# Patient Record
Sex: MALE | Race: WHITE
[De-identification: names, ages, dates, MRNs, and addresses within clinical notes are randomized per-mention and may not be internally consistent; named-entity substitution may affect disease eponyms.]

---

## 2020-06-29 ENCOUNTER — HOSPITAL ENCOUNTER (OUTPATIENT)
Dept: HOSPITAL 53 - M RAD | Age: 60
End: 2020-06-29
Attending: NURSE PRACTITIONER
Payer: MEDICARE

## 2020-06-29 DIAGNOSIS — N25.81: Primary | ICD-10-CM

## 2020-06-29 DIAGNOSIS — E83.52: ICD-10-CM

## 2020-06-29 PROCEDURE — 78803 RP LOCLZJ TUM SPECT 1 AREA: CPT

## 2020-06-29 NOTE — REP
PARATHYROID NUCLEAR SCINTIGRAPHY WITH SPECT:

 

HISTORY:  Hyperparathyroidism.

 

Comparison study is from November 10, 2016.

 

TECHNIQUE:  27.4 mCi of technetium 99m sestamibi is injected.  15-minute and

3-hour delayed planar images are acquired of the neck and chest region.  A SPECT

acquisition is acquired as well.

 

SCINTIGRAPHIC FINDINGS:  15-minute delayed images demonstrate expected salivary

and thyroid uptake.  Thyroid glandular uptake washes out on delayed scan images.

No focal area of retained uptake is seen in the neck or mediastinum to suggest

adenoma. Axial, coronal, and sagittal SPECT imaging shows no additional finding.

 

IMPRESSION:

 

Negative parathyroid nuclear scintigraphy.

 

 

Electronically Signed by

Freddie Ivey MD 06/29/2020 02:48 P

## 2020-09-01 ENCOUNTER — HOSPITAL ENCOUNTER (OUTPATIENT)
Dept: HOSPITAL 53 - M LAB REF | Age: 60
End: 2020-09-01
Attending: NURSE PRACTITIONER
Payer: MEDICARE

## 2020-09-01 DIAGNOSIS — Z94.0: ICD-10-CM

## 2020-09-01 DIAGNOSIS — R53.83: Primary | ICD-10-CM

## 2020-09-01 LAB
T4 FREE SERPL-MCNC: 1.05 NG/DL (ref 0.76–1.46)
TSH SERPL DL<=0.005 MIU/L-ACNC: 1.72 UIU/ML (ref 0.36–3.74)

## 2020-09-15 ENCOUNTER — HOSPITAL ENCOUNTER (OUTPATIENT)
Dept: HOSPITAL 53 - M RAD | Age: 60
End: 2020-09-15
Attending: NURSE PRACTITIONER
Payer: MEDICARE

## 2020-09-15 DIAGNOSIS — N17.9: ICD-10-CM

## 2020-09-15 DIAGNOSIS — Z94.0: Primary | ICD-10-CM

## 2020-09-30 NOTE — REP
TRANSPLANT KIDNEY ULTRASOUND 



HISTORY: Right lower quadrant renal transplant. Acute kidney failure. 



COMPARISON: 11/10/2016. 



SONOGRAPHIC FINDINGS: 

Scanning of the right iliac fossa demonstrates morphologically intact right 
renal transplant measuring 11.3 x 6.3 x 6.2 cm in overall dimension. There is no
evidence of hydronephrosis, cyst, or mass. Previous length measurement was 10.8 
cm. 



DOPPLER ASSESSMENT: 

Pre-anastomotic iliac artery Doppler peak systolic flow velocity is 150 cm/s. 
Main renal artery at the level of the anastomosis has a PSV of 151 cm/s. Mid 
transplant right renal artery peak systolic flow velocity is 131 cm/s and at the
hilum 25 cm/s. The transplant renal vein is widely patent. Resistive indices are
measured in the upper mid and lower third of the transplant kidney and these 
values are normal at 0.62, 0.63, and 0.54 respectively.   



IMPRESSION: 

No evidence of main renal artery stenosis. Somewhat decreased flow in the hilar 
vessels, 25 cm/s flow velocity. No hydronephrosis. Normal renal cortical 
echogenicity.   

MTDD

## 2020-10-01 ENCOUNTER — HOSPITAL ENCOUNTER (OUTPATIENT)
Dept: HOSPITAL 53 - M LAB REF | Age: 60
End: 2020-10-01
Attending: NURSE PRACTITIONER
Payer: MEDICARE

## 2020-10-01 DIAGNOSIS — Z94.0: Primary | ICD-10-CM

## 2020-11-04 ENCOUNTER — HOSPITAL ENCOUNTER (OUTPATIENT)
Dept: HOSPITAL 53 - M LAB REF | Age: 60
End: 2020-11-04
Attending: NURSE PRACTITIONER
Payer: MEDICARE

## 2020-11-04 DIAGNOSIS — Z94.0: Primary | ICD-10-CM

## 2021-01-05 ENCOUNTER — HOSPITAL ENCOUNTER (OUTPATIENT)
Dept: HOSPITAL 53 - M LAB REF | Age: 61
End: 2021-01-05
Attending: INTERNAL MEDICINE
Payer: MEDICARE

## 2021-01-05 DIAGNOSIS — Z94.0: Primary | ICD-10-CM

## 2021-03-05 ENCOUNTER — HOSPITAL ENCOUNTER (OUTPATIENT)
Dept: HOSPITAL 53 - M LAB REF | Age: 61
End: 2021-03-05
Attending: NURSE PRACTITIONER
Payer: MEDICARE

## 2021-03-05 DIAGNOSIS — Z94.0: Primary | ICD-10-CM

## 2021-06-08 ENCOUNTER — HOSPITAL ENCOUNTER (OUTPATIENT)
Dept: HOSPITAL 53 - M LAB REF | Age: 61
End: 2021-06-08
Attending: NURSE PRACTITIONER
Payer: MEDICARE

## 2021-06-08 DIAGNOSIS — Z94.0: ICD-10-CM

## 2021-06-08 DIAGNOSIS — R53.83: Primary | ICD-10-CM

## 2021-06-08 LAB
T3FREE SERPL-MCNC: 2.6 PG/ML (ref 2.2–4)
T4 FREE SERPL-MCNC: 0.9 NG/DL (ref 0.76–1.46)
TSH SERPL DL<=0.005 MIU/L-ACNC: 1.22 UIU/ML (ref 0.36–3.74)

## 2021-09-10 ENCOUNTER — HOSPITAL ENCOUNTER (OUTPATIENT)
Dept: HOSPITAL 53 - M LAB REF | Age: 61
End: 2021-09-10
Attending: NURSE PRACTITIONER
Payer: MEDICARE

## 2021-09-10 DIAGNOSIS — Z94.0: Primary | ICD-10-CM

## 2021-09-10 DIAGNOSIS — R53.83: ICD-10-CM

## 2021-09-10 DIAGNOSIS — E83.42: ICD-10-CM

## 2021-09-10 LAB
MAGNESIUM SERPL-MCNC: 1.6 MG/DL (ref 1.8–2.4)
T3FREE SERPL-MCNC: 2.5 PG/ML (ref 2.2–4)
T4 FREE SERPL-MCNC: 0.89 NG/DL (ref 0.76–1.46)
TSH SERPL DL<=0.005 MIU/L-ACNC: 1.17 UIU/ML (ref 0.36–3.74)

## 2021-11-16 ENCOUNTER — HOSPITAL ENCOUNTER (OUTPATIENT)
Dept: HOSPITAL 53 - M LAB REF | Age: 61
End: 2021-11-16
Attending: NURSE PRACTITIONER
Payer: MEDICARE

## 2021-11-16 DIAGNOSIS — Z94.0: ICD-10-CM

## 2021-11-16 DIAGNOSIS — E83.42: Primary | ICD-10-CM

## 2022-02-17 ENCOUNTER — HOSPITAL ENCOUNTER (OUTPATIENT)
Dept: HOSPITAL 53 - M LAB REF | Age: 62
End: 2022-02-17
Attending: NURSE PRACTITIONER
Payer: MEDICARE

## 2022-02-17 DIAGNOSIS — E83.42: Primary | ICD-10-CM

## 2022-06-15 ENCOUNTER — HOSPITAL ENCOUNTER (OUTPATIENT)
Dept: HOSPITAL 53 - M LAB REF | Age: 62
End: 2022-06-15
Attending: NURSE PRACTITIONER
Payer: MEDICARE

## 2022-06-15 DIAGNOSIS — Z94.0: Primary | ICD-10-CM

## 2022-06-15 LAB
CREAT UR-MCNC: 111 MG/DL
PROT UR-MCNC: 28.5 MG/DL (ref 0–12)

## 2022-08-02 ENCOUNTER — HOSPITAL ENCOUNTER (OUTPATIENT)
Dept: HOSPITAL 53 - M RAD | Age: 62
End: 2022-08-02
Attending: NURSE PRACTITIONER
Payer: MEDICARE

## 2022-08-02 DIAGNOSIS — N62: ICD-10-CM

## 2022-08-02 DIAGNOSIS — R91.8: Primary | ICD-10-CM

## 2022-08-02 PROCEDURE — 71260 CT THORAX DX C+: CPT

## 2022-09-15 ENCOUNTER — HOSPITAL ENCOUNTER (OUTPATIENT)
Dept: HOSPITAL 53 - M LAB REF | Age: 62
End: 2022-09-15
Attending: NURSE PRACTITIONER
Payer: MEDICARE

## 2022-09-15 DIAGNOSIS — Z94.0: Primary | ICD-10-CM

## 2022-10-13 ENCOUNTER — HOSPITAL ENCOUNTER (OUTPATIENT)
Dept: HOSPITAL 53 - M LAB REF | Age: 62
End: 2022-10-13
Attending: NURSE PRACTITIONER
Payer: MEDICARE

## 2022-10-13 DIAGNOSIS — Z94.0: Primary | ICD-10-CM

## 2023-01-24 ENCOUNTER — HOSPITAL ENCOUNTER (OUTPATIENT)
Dept: HOSPITAL 53 - M LAB REF | Age: 63
End: 2023-01-24
Attending: NURSE PRACTITIONER
Payer: MEDICARE

## 2023-01-24 DIAGNOSIS — Z94.0: Primary | ICD-10-CM

## 2023-04-25 ENCOUNTER — HOSPITAL ENCOUNTER (OUTPATIENT)
Dept: HOSPITAL 53 - M LAB REF | Age: 63
End: 2023-04-25
Attending: NURSE PRACTITIONER
Payer: MEDICARE

## 2023-04-25 DIAGNOSIS — Z94.0: Primary | ICD-10-CM
